# Patient Record
Sex: FEMALE | Race: WHITE | NOT HISPANIC OR LATINO | URBAN - METROPOLITAN AREA
[De-identification: names, ages, dates, MRNs, and addresses within clinical notes are randomized per-mention and may not be internally consistent; named-entity substitution may affect disease eponyms.]

---

## 2017-01-17 ENCOUNTER — APPOINTMENT (RX ONLY)
Dept: URBAN - METROPOLITAN AREA SURGERY 2 | Facility: SURGERY | Age: 42
Setting detail: DERMATOLOGY
End: 2017-01-17

## 2017-01-17 DIAGNOSIS — Z41.9 ENCOUNTER FOR PROCEDURE FOR PURPOSES OTHER THAN REMEDYING HEALTH STATE, UNSPECIFIED: ICD-10-CM

## 2017-01-17 PROCEDURE — ? BOTOX

## 2017-01-17 PROCEDURE — ? CONSULTATION - BOTULINUM TOXIN

## 2017-01-17 PROCEDURE — ? CONSULTATION - FILLERS

## 2017-01-17 PROCEDURE — ? FILLERS

## 2017-01-17 PROCEDURE — ? PRESCRIPTION

## 2017-01-17 RX ORDER — VALACYCLOVIR HYDROCHLORIDE 500 MG/1
TABLET, FILM COATED ORAL
Qty: 28 | Refills: 2 | Status: ERX | COMMUNITY
Start: 2017-01-17

## 2017-01-17 RX ADMIN — VALACYCLOVIR HYDROCHLORIDE: 500 TABLET, FILM COATED ORAL at 14:01

## 2017-01-17 NOTE — PROCEDURE: CONSULTATION - FILLERS
Labiomental Sulcus Secondary Filler Volume In Cc (Estimated): 0
Detail Level: Zone
Send Procedure Quote As Charge: No

## 2017-01-17 NOTE — PROCEDURE: BOTOX
Additional Area 2 Location: Chin
Additional Area 2 Units: 0
Bill Summary Price Listed Below, Or Bill Total Of Units X Price Per Unit?: Bill #Units x Price Per Unit
Additional Area 1 Location: Eyebrows rhytids
Glabellar Complex Units: 10
Administered By (Optional): Georgia Boucher RN
Expiration Date (Month Year): 08/19
Postcare Instructions: Patient instructed to not lie down for 4 hours and limit physical activity for 24 hours. Patient instructed not to travel by airplane for 48 hours.\\n\\n*****
Additional Area 3 Location: Eyebrow
Forehead Units: 5
Consent: Written consent was obtained prior to the procedure. Risks, benefits, expectations and alternatives were discussed including, but not limited to, infection, bleeding, lid/brow ptosis, bruising, swelling, diplopia, temporary effects, incomplete chemical denervation and dissatisfaction with the cosmetic outcome. No guarantee or warranty was given or implied regarding longevity of results.
Use Map Statement For Sites (Optional): No
Detail Level: Simple
Show Price In Note?: yes
Lot #: [id number]
Price Per Unit In $ (Use Numbers Only, No Text Please.): 17
Map Statment: See attached map for complete details

## 2017-01-17 NOTE — PROCEDURE: FILLERS
Vermillion Lips Filler Volume In Cc: 0
Expiration Date (Month Year): 07/17
Price $ (Numeric Only, No Text Please.): 051
Price $ (Numeric Only, No Text Please.): 244
Use Map Statement For Sites (Optional): No
Additional Area 2 Location: Hindu divot
Expiration Date (Month Year): 03/18
Detail Level: Simple
Filler: Belotero
Filler: Juvederm Ultra XC
Lot #: [id number]
Administered By (Optional): Georgia Boucher
Additional Area 1 Location: Lips
Consent: Written consent obtained. Risks include but not limited to bruising, beading, irregular texture, ulceration, infection, allergic reaction, scar formation, incomplete augmentation, temporary nature, procedural pain.
Tear Troughs Filler Volume In Cc: 0.4
Lot #: [id number]
Map Statment: See attached map for complete details
Additional Area 1 Volume In Cc: 0.2
Topical Anesthesia?: yes
Additional Area [address] Location: [facility]

## 2017-01-30 ENCOUNTER — APPOINTMENT (RX ONLY)
Dept: URBAN - METROPOLITAN AREA SURGERY 2 | Facility: SURGERY | Age: 42
Setting detail: DERMATOLOGY
End: 2017-01-30

## 2017-01-30 DIAGNOSIS — Z42.8 ENCOUNTER FOR OTHER PLASTIC AND RECONSTRUCTIVE SURGERY FOLLOWING MEDICAL PROCEDURE OR HEALED INJURY: ICD-10-CM

## 2017-01-30 DIAGNOSIS — Z428 OTHER AFTERCARE INVOLVING THE USE OF PLASTIC SURGERY: ICD-10-CM

## 2017-01-30 PROCEDURE — ? FILLERS

## 2017-01-30 NOTE — PROCEDURE: FILLERS
Lateral Canthal Filler Volume In Cc: 0
Detail Level: Simple
Additional Area 2 Location: Yazdanism divot
Filler: Belotero
Lot #: Residual
Filler: Juvederm Ultra XC
Use Map Statement For Sites (Optional): No
Map Statment: See attached map for complete details
Topical Anesthesia?: yes
Additional Area 1 Volume In Cc: 0.2
Additional Area 1 Location: Lips
Consent: Written consent obtained. Risks include but not limited to bruising, beading, irregular texture, ulceration, infection, allergic reaction, scar formation, incomplete augmentation, temporary nature, procedural pain.
Additional Area 4 Location: [facility]
Administered By (Optional): Georgia Boucher

## 2017-03-01 ENCOUNTER — APPOINTMENT (RX ONLY)
Dept: URBAN - METROPOLITAN AREA SURGERY 2 | Facility: SURGERY | Age: 42
Setting detail: DERMATOLOGY
End: 2017-03-01

## 2017-03-01 DIAGNOSIS — Z41.9 ENCOUNTER FOR PROCEDURE FOR PURPOSES OTHER THAN REMEDYING HEALTH STATE, UNSPECIFIED: ICD-10-CM

## 2017-03-01 PROCEDURE — ? FILLERS

## 2017-03-01 NOTE — PROCEDURE: FILLERS
Temple Hollows Filler Volume In Cc: 0
Map Statment: See attached map for complete details
Additional Area 1 Volume In Cc: 0.1
Administered By (Optional): Georgia Boucher
Additional Area 4 Location: [facility]
Filler: Belotero
Additional Area 1 Location: Lips
Consent: Written consent obtained. Risks include but not limited to bruising, beading, irregular texture, ulceration, infection, allergic reaction, scar formation, incomplete augmentation, temporary nature, procedural pain.
Topical Anesthesia?: yes
Detail Level: Simple
Lot #: Residual
Additional Area 1 Location: Glabella
Filler: Juvederm Ultra XC
Additional Area 2 Location: Christian divot
Use Map Statement For Sites (Optional): No

## 2017-03-10 ENCOUNTER — APPOINTMENT (RX ONLY)
Dept: URBAN - METROPOLITAN AREA SURGERY 2 | Facility: SURGERY | Age: 42
Setting detail: DERMATOLOGY
End: 2017-03-10

## 2017-03-10 DIAGNOSIS — Z41.9 ENCOUNTER FOR PROCEDURE FOR PURPOSES OTHER THAN REMEDYING HEALTH STATE, UNSPECIFIED: ICD-10-CM

## 2017-03-10 PROCEDURE — ? FILLERS

## 2017-03-10 NOTE — PROCEDURE: FILLERS
Vermillion Lips Filler Volume In Cc: 0
Additional Area 4 Location: [facility]
Filler: Belotero
Additional Area 1 Volume In Cc: 0.1
Filler: Restylane Refyne
Detail Level: Simple
Additional Area 1 Location: Temples
Additional Area 2 Location: Episcopal divot
Topical Anesthesia?: yes
Filler: Juvederm Ultra XC
Consent: Written consent obtained. Risks include but not limited to bruising, beading, irregular texture, ulceration, infection, allergic reaction, scar formation, incomplete augmentation, temporary nature, procedural pain.
Map Statment: See attached map for complete details
Lot #: [id number]
Use Map Statement For Sites (Optional): No
Lot #: Residual
Administered By (Optional): Georgia Boucher
Tear Troughs Filler Volume In Cc: 0.2
Expiration Date (Month Year): 05/18
Additional Area 1 Location: Lips

## 2017-04-04 ENCOUNTER — APPOINTMENT (RX ONLY)
Dept: URBAN - METROPOLITAN AREA SURGERY 2 | Facility: SURGERY | Age: 42
Setting detail: DERMATOLOGY
End: 2017-04-04

## 2017-04-04 DIAGNOSIS — Z41.9 ENCOUNTER FOR PROCEDURE FOR PURPOSES OTHER THAN REMEDYING HEALTH STATE, UNSPECIFIED: ICD-10-CM

## 2017-04-04 DIAGNOSIS — Z428 OTHER AFTERCARE INVOLVING THE USE OF PLASTIC SURGERY: ICD-10-CM

## 2017-04-04 PROCEDURE — ? FILLERS

## 2017-04-04 PROCEDURE — ? BOTOX

## 2017-04-04 NOTE — PROCEDURE: FILLERS
Cheeks Filler Volume In Cc: 0
Topical Anesthesia?: yes
Additional Area 2 Location: Forehead depression
Use Map Statement For Sites (Optional): No
Lower Lips Filler Volume In Cc: 0.2
Additional Area 1 Location: Glabellar lines
Additional Area 4 Location: [facility]
Administered By (Optional): Georgia Boucher
Filler: Juvederm Ultra XC
Lot #: Residual
Additional Area 1 Location: Nasal dorsum
Detail Level: Simple
Consent: Written consent obtained. Risks include but not limited to bruising, beading, irregular texture, ulceration, infection, allergic reaction, scar formation, incomplete augmentation, temporary nature, procedural pain.
Filler: Restylane Refyne
Additional Area 1 Location: Oral commissures
Map Statment: See attached map for complete details
Filler: Belotero
Tear Troughs Filler Volume In Cc: 0.1

## 2017-04-04 NOTE — PROCEDURE: BOTOX
Price Per Unit In $ (Use Numbers Only, No Text Please.): 17
Consent: Written consent was obtained prior to the procedure. Risks, benefits, expectations and alternatives were discussed including, but not limited to, infection, bleeding, lid/brow ptosis, bruising, swelling, diplopia, temporary effects, incomplete chemical denervation and dissatisfaction with the cosmetic outcome. No guarantee or warranty was given or implied regarding longevity of results.
Lot #: [id number]
Nasal Root Units: 0
Map Statment: See attached map for complete details
Bill Summary Price Listed Below, Or Bill Total Of Units X Price Per Unit?: Bill #Units x Price Per Unit
Expiration Date (Month Year): 10/2019
Use Map Statement For Sites (Optional): No
Additional Area 2 Location: Chin
Detail Level: Simple
Additional Area 1 Location: Necklace lines
Glabellar Complex Units: 12.5
Dilution (U/0.1 Cc): 5
Administered By (Optional): Georgia Boucher RN
Additional Area 3 Location: Left Brow
Postcare Instructions: Patient instructed to not lie down for 4 hours and limit physical activity for 24 hours. Patient instructed not to travel by airplane for 48 hours.\\n\\n*****
Show Price In Note?: yes

## 2017-05-05 ENCOUNTER — APPOINTMENT (RX ONLY)
Dept: URBAN - METROPOLITAN AREA SURGERY 2 | Facility: SURGERY | Age: 42
Setting detail: DERMATOLOGY
End: 2017-05-05

## 2017-05-05 DIAGNOSIS — Z41.9 ENCOUNTER FOR PROCEDURE FOR PURPOSES OTHER THAN REMEDYING HEALTH STATE, UNSPECIFIED: ICD-10-CM

## 2017-05-05 PROCEDURE — ? FILLERS

## 2017-05-05 NOTE — PROCEDURE: FILLERS
Cheeks Filler Volume In Cc: 0
Expiration Date (Month Year): 05/18
Detail Level: Simple
Additional Area 1 Location: Perioral
Map Statment: See attached map for complete details
Additional Area 4 Location: [facility]
Filler: Restylane Refyne
Additional Area 2 Location: Nasal root
Additional Area 1 Location: Oral commissures
Tear Troughs Filler Volume In Cc: 0.1
Additional Area 3 Location: Ear lobe
Upper Lip Filler Volume In Cc: 0.2
Topical Anesthesia?: yes
Filler: Juvederm Ultra XC
Lot #: [id number]
Additional Area 3 Location: Glabellar
Consent: Written consent obtained. Risks include but not limited to bruising, beading, irregular texture, ulceration, infection, allergic reaction, scar formation, incomplete augmentation, temporary nature, procedural pain.
Filler: Belotero
Additional Area 5 Location: Lips
Lot #: Residual
Administered By (Optional): Georgia Boucher
Use Map Statement For Sites (Optional): No
Price $ (Numeric Only, No Text Please.): 375

## 2017-07-03 ENCOUNTER — APPOINTMENT (RX ONLY)
Dept: URBAN - METROPOLITAN AREA SURGERY 2 | Facility: SURGERY | Age: 42
Setting detail: DERMATOLOGY
End: 2017-07-03

## 2017-07-03 DIAGNOSIS — Z428 OTHER AFTERCARE INVOLVING THE USE OF PLASTIC SURGERY: ICD-10-CM

## 2017-07-03 DIAGNOSIS — Z41.9 ENCOUNTER FOR PROCEDURE FOR PURPOSES OTHER THAN REMEDYING HEALTH STATE, UNSPECIFIED: ICD-10-CM

## 2017-07-03 PROCEDURE — ? BOTOX

## 2017-07-03 PROCEDURE — ? FILLERS

## 2017-07-03 NOTE — PROCEDURE: FILLERS
Show Price In Note?: yes
Temple Hollows Filler Volume In Cc: 0
Topical Anesthetic Base: ointment
Additional Area 5 Location: Lips
Additional Area 3 Location: Oral commissures
Filler: Belotero
Additional Area 2 Location: Glabellar
Additional Area 3 Location: Ear lobe
Filler: Juvederm Ultra XC
Consent: Written consent obtained. Risks include but not limited to bruising, beading, irregular texture, ulceration, infection, allergic reaction, scar formation, incomplete augmentation, temporary nature, procedural pain.
Expiration Date (Month Year): 09/2017
Detail Level: Simple
Additional Area 1 Location: Perioral
Additional Area 2 Location: ear lobes
Lot #: [id number]
Use Map Statement For Sites (Optional): No
Additional Area 4 Location: scar on nose
Administered By (Optional): Georgia Boucher
Map Statment: See attached map for complete details

## 2017-07-03 NOTE — PROCEDURE: BOTOX
Additional Area 3 Location: DTI
Lot #: [id number]
Forehead Units: 0
Dilution (U/0.1 Cc): 5
Show Price In Note?: yes
Periorbital Skin Units: 15
Bill Summary Price Listed Below, Or Bill Total Of Units X Price Per Unit?: Bill #Units x Price Per Unit
Additional Area 2 Location: Chin
Postcare Instructions: Patient instructed to not lie down for 4 hours and limit physical activity for 24 hours. Patient instructed not to travel by airplane for 48 hours.\\n\\n*****
Expiration Date (Month Year): 01/20
Additional Area 1 Location: perioral
Consent: Written consent was obtained prior to the procedure. Risks, benefits, expectations and alternatives were discussed including, but not limited to, infection, bleeding, lid/brow ptosis, bruising, swelling, diplopia, temporary effects, incomplete chemical denervation and dissatisfaction with the cosmetic outcome. No guarantee or warranty was given or implied regarding longevity of results.
Detail Level: Simple
Price Per Unit In $ (Use Numbers Only, No Text Please.): 12
Administered By (Optional): Georgia Boucher RN
Use Map Statement For Sites (Optional): No
Map Statment: See attached map for complete details

## 2017-09-20 ENCOUNTER — APPOINTMENT (RX ONLY)
Dept: URBAN - METROPOLITAN AREA SURGERY 2 | Facility: SURGERY | Age: 42
Setting detail: DERMATOLOGY
End: 2017-09-20

## 2017-09-20 DIAGNOSIS — Z41.9 ENCOUNTER FOR PROCEDURE FOR PURPOSES OTHER THAN REMEDYING HEALTH STATE, UNSPECIFIED: ICD-10-CM

## 2017-09-20 DIAGNOSIS — Z428 OTHER AFTERCARE INVOLVING THE USE OF PLASTIC SURGERY: ICD-10-CM

## 2017-09-20 PROCEDURE — ? FILLERS

## 2017-09-20 PROCEDURE — ? BOTOX

## 2017-09-20 PROCEDURE — ? PRESCRIPTION

## 2017-09-20 RX ORDER — VALACYCLOVIR HYDROCHLORIDE 500 MG/1
TABLET, FILM COATED ORAL
Qty: 30 | Refills: 2 | Status: ERX

## 2017-09-20 NOTE — PROCEDURE: BOTOX
Show Price In Note?: yes
Additional Area 1 Units: 0
Additional Area 3 Location: DTI
Price Per Unit In $ (Use Numbers Only, No Text Please.): 13
Additional Area 2 Location: Nasolabial fold
Detail Level: Simple
Forehead Units: 5
Additional Area 1 Location: left eyebrow
Consent: Written consent was obtained prior to the procedure. Risks, benefits, expectations and alternatives were discussed including, but not limited to, infection, bleeding, lid/brow ptosis, bruising, swelling, diplopia, temporary effects, incomplete chemical denervation and dissatisfaction with the cosmetic outcome. No guarantee or warranty was given or implied regarding longevity of results.
Map Statment: See attached map for complete details
Use Map Statement For Sites (Optional): No
Postcare Instructions: Patient instructed to not lie down for 4 hours and limit physical activity for 24 hours. Patient instructed not to travel by airplane for 48 hours.\\n\\n*****
Bill Summary Price Listed Below, Or Bill Total Of Units X Price Per Unit?: Bill #Units x Price Per Unit
Glabellar Complex Units: 10
Administered By (Optional): Georgia Boucher RN
Expiration Date (Month Year): 04/20
Lot #: [id number]

## 2017-12-29 ENCOUNTER — APPOINTMENT (RX ONLY)
Dept: URBAN - METROPOLITAN AREA CLINIC 12 | Facility: CLINIC | Age: 42
Setting detail: DERMATOLOGY
End: 2017-12-29

## 2017-12-29 DIAGNOSIS — Z41.9 ENCOUNTER FOR PROCEDURE FOR PURPOSES OTHER THAN REMEDYING HEALTH STATE, UNSPECIFIED: ICD-10-CM

## 2017-12-29 DIAGNOSIS — Z428 OTHER AFTERCARE INVOLVING THE USE OF PLASTIC SURGERY: ICD-10-CM

## 2017-12-29 PROCEDURE — ? BOTOX

## 2017-12-29 PROCEDURE — ? FILLERS

## 2017-12-29 NOTE — PROCEDURE: FILLERS
Show Price In Note?: yes
Vermillion Lips Filler Volume In Cc: 0
Additional Area 3 Location: perioral lines
Expiration Date (Month Year): 06/18
Consent: Written consent obtained. Risks include but not limited to bruising, beading, irregular texture, ulceration, infection, allergic reaction, scar formation, incomplete augmentation, temporary nature, procedural pain.
Topical % #1: 23
Detail Level: Simple
Lot #: [id number]
Additional Area 1 Location: glabellar/nasal root lines
Additional Area 1 Volume In Cc: 0.1
Topical Anesthetic Base: ointment
Map Statment: See attached map for complete details
Filler: Juvederm Ultra Plus XC
Additional Area 4 Location: periorbital
Topical Anesthetic #2: tetracaine
Additional Area 2 Location: forehead depressions
Lot #: [id number]
Expiration Date (Month Year): 09/18
Additional Area 5 Location: oral commissures
Filler: Belotero
Topical Anesthetic #1: lidocaine
Topical % #2: 7
Administered By (Optional): Georgia Boucher

## 2017-12-29 NOTE — PROCEDURE: BOTOX
Map Statment: See attached map for complete details
Detail Level: Simple
Additional Area 1 Units: 0
Additional Area 3 Location: necklace lines
Additional Area 1 Location: chin
Consent: Written consent was obtained prior to the procedure. Risks, benefits, expectations and alternatives were discussed including, but not limited to, infection, bleeding, lid/brow ptosis, bruising, swelling, diplopia, temporary effects, incomplete chemical denervation and dissatisfaction with the cosmetic outcome. No guarantee or warranty was given or implied regarding longevity of results.
Dilution (U/0.1 Cc): 5
Price Per Unit In $ (Use Numbers Only, No Text Please.): 15
Glabellar Complex Units: 10
Expiration Date (Month Year): 08/20
Show Price In Note?: yes
Bill Summary Price Listed Below, Or Bill Total Of Units X Price Per Unit?: Bill #Units x Price Per Unit
Additional Area 2 Location: muscle spasm
Lot #: [id number]
Topical Anesthesia?: 23% lidocaine, 7% tetracaine
Administered By (Optional): Georgia Boucher
Postcare Instructions: Patient instructed to not lie down for 4 hours and limit physical activity for 24 hours. Patient instructed not to travel by airplane for 48 hours.

## 2018-04-06 ENCOUNTER — APPOINTMENT (RX ONLY)
Dept: URBAN - METROPOLITAN AREA CLINIC 12 | Facility: CLINIC | Age: 43
Setting detail: DERMATOLOGY
End: 2018-04-06

## 2018-04-06 DIAGNOSIS — Z41.9 ENCOUNTER FOR PROCEDURE FOR PURPOSES OTHER THAN REMEDYING HEALTH STATE, UNSPECIFIED: ICD-10-CM

## 2018-04-06 PROCEDURE — ? FILLERS

## 2018-04-06 PROCEDURE — ? BOTOX

## 2018-04-06 NOTE — PROCEDURE: BOTOX
Postcare Instructions: Patient instructed to not lie down for 4 hours and limit physical activity for 24 hours. Patient instructed not to travel by airplane for 48 hours.
Additional Area 1 Units: 0
Glabellar Complex Units: 10
Forehead Units: 5
Additional Area 2 Location: nasalis depressor
Show Price In Note?: yes
Expiration Date (Month Year): 10/2020
Additional Area 1 Location: chin
Map Statment: See attached map for complete details
Price Per Unit In $ (Use Numbers Only, No Text Please.): 15
Lot #: [id number]
Consent: Written consent was obtained prior to the procedure. Risks, benefits, expectations and alternatives were discussed including, but not limited to, infection, bleeding, lid/brow ptosis, bruising, swelling, diplopia, temporary effects, incomplete chemical denervation and dissatisfaction with the cosmetic outcome. No guarantee or warranty was given or implied regarding longevity of results.
Detail Level: Simple
Additional Area 3 Location: tail end of brows
Bill Summary Price Listed Below, Or Bill Total Of Units X Price Per Unit?: Bill #Units x Price Per Unit
Administered By (Optional): Georgia Boucher

## 2018-04-06 NOTE — PROCEDURE: FILLERS
Jawline Filler Volume In Cc: 0
Use Map Statement For Sites (Optional): Yes
Administered By (Optional): Georgia Boucher
Lower Lips Filler Volume In Cc: 0.1
Detail Level: Simple
Additional Area 1 Location: shadow upper lip
Additional Area 5 Location: oral commissures
Additional Area 4 Location: chin
Consent: Written consent obtained. Risks include but not limited to bruising, beading, irregular texture, ulceration, infection, allergic reaction, scar formation, incomplete augmentation, temporary nature, procedural pain.
Additional Area 2 Location: forehead depressions
Lot #: residual
Topical Anesthetic #1: lidocaine
Filler: Juvederm Ultra Plus XC
Additional Area 1 Location: glabellar/nasal root lines
Topical Anesthetic #2: tetracaine
Map Statment: See attached map for complete details
Topical Anesthetic Base: ointment
Additional Area 3 Location: perioral lines
Topical % #1: 23
Topical % #2: 7

## 2018-05-18 ENCOUNTER — APPOINTMENT (RX ONLY)
Dept: URBAN - METROPOLITAN AREA CLINIC 12 | Facility: CLINIC | Age: 43
Setting detail: DERMATOLOGY
End: 2018-05-18

## 2018-05-18 DIAGNOSIS — Z428 OTHER AFTERCARE INVOLVING THE USE OF PLASTIC SURGERY: ICD-10-CM

## 2018-05-18 DIAGNOSIS — Z42.8 ENCOUNTER FOR OTHER PLASTIC AND RECONSTRUCTIVE SURGERY FOLLOWING MEDICAL PROCEDURE OR HEALED INJURY: ICD-10-CM

## 2018-05-18 PROCEDURE — ? FILLERS

## 2018-05-18 PROCEDURE — ? BOTOX

## 2018-05-18 NOTE — PROCEDURE: BOTOX
Lot #: [id number]
Expiration Date (Month Year): 12/2020
Perioral Units: 0
Consent: Written consent was obtained prior to the procedure. Risks, benefits, expectations and alternatives were discussed including, but not limited to, infection, bleeding, lid/brow ptosis, bruising, swelling, diplopia, temporary effects, incomplete chemical denervation and dissatisfaction with the cosmetic outcome. No guarantee or warranty was given or implied regarding longevity of results.
Additional Area 2 Location: nasalis depressor
Use Map Statement For Sites (Optional): Yes
Forehead Units: 5
Detail Level: Simple
Map Statment: See attached map for complete details
Additional Area 1 Location: chin
Additional Area 3 Location: tail end of brows
Price Per Unit In $ (Use Numbers Only, No Text Please.): 12
Administered By (Optional): Georgia Boucher
Postcare Instructions: Patient instructed to not lie down for 4 hours and limit physical activity for 24 hours. Patient instructed not to travel by airplane for 48 hours.
Bill Summary Price Listed Below, Or Bill Total Of Units X Price Per Unit?: Bill #Units x Price Per Unit

## 2018-05-18 NOTE — PROCEDURE: FILLERS
Lateral Face Filler Volume In Cc: 0
Detail Level: Simple
Additional Area 3 Location: glabellar scar and perioral line
Lot #: residual
Additional Area 2 Location: glabellar line
Topical Anesthetic #1: lidocaine
Additional Area 5 Location: oral commissures
Additional Area 4 Location: cheek depressions
Topical % #2: 7
Consent: Written consent obtained. Risks include but not limited to bruising, beading, irregular texture, ulceration, infection, allergic reaction, scar formation, incomplete augmentation, temporary nature, procedural pain.
Additional Area 4 Location: chin
Topical % #1: 23
Topical Anesthesia?: yes
Additional Area 3 Location: perioral lines
Additional Area 2 Location: forehead depressions
Additional Area 1 Location: shadow upper lip
Topical Anesthetic Base: ointment
Map Statment: See attached map for complete details
Topical Anesthetic #2: tetracaine
Lower Lips Filler Volume In Cc: 0.1
Filler: Juvederm Ultra XC
Administered By (Optional): Georgia Boucher RN
Additional Area 1 Location: glabellar/nasal root lines

## 2018-08-10 ENCOUNTER — APPOINTMENT (RX ONLY)
Dept: URBAN - METROPOLITAN AREA CLINIC 12 | Facility: CLINIC | Age: 43
Setting detail: DERMATOLOGY
End: 2018-08-10

## 2018-08-10 DIAGNOSIS — Z41.9 ENCOUNTER FOR PROCEDURE FOR PURPOSES OTHER THAN REMEDYING HEALTH STATE, UNSPECIFIED: ICD-10-CM

## 2018-08-10 PROCEDURE — ? FILLERS

## 2018-08-10 PROCEDURE — ? BOTOX

## 2018-08-10 PROCEDURE — ? PRESCRIPTION

## 2018-08-10 RX ORDER — VALACYCLOVIR HYDROCHLORIDE 500 MG/1
TABLET, FILM COATED ORAL
Qty: 30 | Refills: 2 | Status: ERX

## 2018-08-10 NOTE — PROCEDURE: FILLERS
Lateral Face Filler Volume In Cc: 0
Lot #: residual
Topical Anesthesia?: yes
Detail Level: Simple
Topical % #1: 23
Additional Area 5 Location: oral commissures
Topical % #2: 7
Map Statment: See attached map for complete details
Consent: Written consent obtained. Risks include but not limited to bruising, beading, irregular texture, ulceration, infection, allergic reaction, scar formation, incomplete augmentation, temporary nature, procedural pain.
Administered By (Optional): Georgia Boucher
Additional Area 1 Location: glabellar/nasal root lines
Topical Anesthetic #2: tetracaine
Upper Lip Filler Volume In Cc: 0.1
Additional Area 2 Location: forehead depressions
Topical Anesthetic Base: ointment
Additional Area 4 Location: chin
Filler: Juvederm Ultra Plus XC
Additional Area 3 Location: perioral lines
Topical Anesthetic #1: lidocaine
Additional Area 1 Location: shadow upper lip

## 2018-08-10 NOTE — PROCEDURE: BOTOX
Platsyma Units: 0
Additional Area 3 Location: tail end of brows
Use Map Statement For Sites (Optional): Yes
Administered By (Optional): Georgia Boucher
Forehead Units: 5
Additional Area 2 Location: nasalis depressor
Expiration Date (Month Year): 03/2021
Consent: Written consent was obtained prior to the procedure. Risks, benefits, expectations and alternatives were discussed including, but not limited to, infection, bleeding, lid/brow ptosis, bruising, swelling, diplopia, temporary effects, incomplete chemical denervation and dissatisfaction with the cosmetic outcome. No guarantee or warranty was given or implied regarding longevity of results.
Bill Summary Price Listed Below, Or Bill Total Of Units X Price Per Unit?: Bill #Units x Price Per Unit
Glabellar Complex Units: 10
Lot #: [id number]
Detail Level: Simple
Additional Area 1 Location: chin
Postcare Instructions: Patient instructed to not lie down for 4 hours and limit physical activity for 24 hours. Patient instructed not to travel by airplane for 48 hours.
Price Per Unit In $ (Use Numbers Only, No Text Please.): 15
Map Statment: See attached map for complete details

## 2018-11-30 ENCOUNTER — APPOINTMENT (RX ONLY)
Dept: URBAN - METROPOLITAN AREA CLINIC 12 | Facility: CLINIC | Age: 43
Setting detail: DERMATOLOGY
End: 2018-11-30

## 2018-11-30 DIAGNOSIS — Z41.9 ENCOUNTER FOR PROCEDURE FOR PURPOSES OTHER THAN REMEDYING HEALTH STATE, UNSPECIFIED: ICD-10-CM

## 2018-11-30 PROCEDURE — ? BOTOX

## 2018-11-30 ASSESSMENT — LOCATION ZONE DERM: LOCATION ZONE: FACE

## 2018-11-30 ASSESSMENT — LOCATION SIMPLE DESCRIPTION DERM: LOCATION SIMPLE: SUPERIOR FOREHEAD

## 2018-11-30 ASSESSMENT — LOCATION DETAILED DESCRIPTION DERM: LOCATION DETAILED: SUPERIOR MID FOREHEAD

## 2018-11-30 NOTE — PROCEDURE: BOTOX
Detail Level: Simple
Additional Area 3 Location: nasalis depressor
Additional Area 1 Location: [facility]
Price Per Unit In $ (Use Numbers Only, No Text Please.): 17.00
Consent: Written consent was obtained prior to the procedure. Risks, benefits, expectations and alternatives were discussed including, but not limited to, infection, bleeding, lid/brow ptosis, bruising, swelling, diplopia, temporary effects, incomplete chemical denervation and dissatisfaction with the cosmetic outcome. No guarantee or warranty was given or implied regarding longevity of results.
Map Statment: See attached map for complete details
Periorbital Skin Units: 0
Reconstitution Date: 11/30/2018
Postcare Instructions: Patient instructed to not lie down for 4 hours and limit physical activity for 24 hours. Patient instructed not to travel by airplane for 48 hours.
Dilution (U/0.1 Cc): 5
Lot #: [id number]
Summary Price $ (Use Numbers Only, No Text Please.): 297.31
Glabellar Complex Units: 10
Additional Area 2 Location: tail end of brow
Use Map Statement For Sites (Optional): Yes
Administered By (Optional): Georgia Boucher
Bill Summary Price Listed Below, Or Bill Total Of Units X Price Per Unit?: Bill Summary Price Below
Expiration Date (Month Year): 05/2021
Forehead Units: 7.5

## 2019-03-22 ENCOUNTER — APPOINTMENT (RX ONLY)
Dept: URBAN - METROPOLITAN AREA CLINIC 12 | Facility: CLINIC | Age: 44
Setting detail: DERMATOLOGY
End: 2019-03-22

## 2019-03-22 DIAGNOSIS — Z41.9 ENCOUNTER FOR PROCEDURE FOR PURPOSES OTHER THAN REMEDYING HEALTH STATE, UNSPECIFIED: ICD-10-CM

## 2019-03-22 PROCEDURE — ? BOTOX

## 2019-03-22 PROCEDURE — ? FILLERS

## 2019-03-22 PROCEDURE — ? PRESCRIPTION

## 2019-03-22 RX ORDER — VALACYCLOVIR HYDROCHLORIDE 500 MG/1
TABLET, FILM COATED ORAL
Qty: 30 | Refills: 4 | Status: ERX

## 2019-03-22 NOTE — PROCEDURE: BOTOX
Map Statment: See attached map for complete details
Periorbital Skin Units: 5
Additional Area 3 Location: nasalis depressor
Show Price In Note?: yes
Detail Level: Simple
Lot #: [id number]
Consent: Written consent was obtained prior to the procedure. Risks, benefits, expectations and alternatives were discussed including, but not limited to, infection, bleeding, lid/brow ptosis, bruising, swelling, diplopia, temporary effects, incomplete chemical denervation and dissatisfaction with the cosmetic outcome. No guarantee or warranty was given or implied regarding longevity of results.
Nasal Root Units: 0
Price Per Unit In $ (Use Numbers Only, No Text Please.): 15
Expiration Date (Month Year): 08/2021
Additional Area 2 Location: Chin
Additional Area 1 Location: tail of brow
Administered By (Optional): Georgia Boucher
Postcare Instructions: Patient instructed to not lie down for 4 hours and limit physical activity for 24 hours. Patient instructed not to travel by airplane for 48 hours.
Bill Summary Price Listed Below, Or Bill Total Of Units X Price Per Unit?: Bill #Units x Price Per Unit
Glabellar Complex Units: 10

## 2019-03-22 NOTE — PROCEDURE: FILLERS
Upper Lip Filler Volume In Cc: 0.1
Lateral Face Filler Volume In Cc: 0
Additional Area 5 Location: frontalis rhytids
Additional Area 4 Location: necklace lines
Detail Level: Simple
Filler: Juvederm Ultra XC
Use Map Statement For Sites (Optional): Yes
Additional Area 5 Location: mentalis and jawline
Additional Area 1 Location: horizontal perioral line
Topical % #1: 23
Consent: Written consent obtained. Risks include but not limited to bruising, beading, irregular texture, ulceration, infection, allergic reaction, scar formation, incomplete augmentation, temporary nature, procedural pain.
Additional Area 1 Location: glabellar/nasal root lines
Lot #: residual
Topical Anesthetic #1: lidocaine
Additional Area 2 Location: glabellar line
Additional Area 2 Location: forehead depressions
Map Statment: See attached map for complete details
Administered By (Optional): Georgia Boucher RN
Additional Area 3 Location: perioral lines and chin
Additional Area 3 Location: perioral lines
Topical Anesthetic Base: ointment
Topical % #2: 7
Topical Anesthetic #2: tetracaine
Additional Area 4 Location: cheek depressions

## 2019-08-05 ENCOUNTER — APPOINTMENT (RX ONLY)
Dept: URBAN - METROPOLITAN AREA CLINIC 12 | Facility: CLINIC | Age: 44
Setting detail: DERMATOLOGY
End: 2019-08-05

## 2019-08-05 DIAGNOSIS — Z41.9 ENCOUNTER FOR PROCEDURE FOR PURPOSES OTHER THAN REMEDYING HEALTH STATE, UNSPECIFIED: ICD-10-CM

## 2019-08-05 PROCEDURE — ? BOTOX

## 2019-08-05 PROCEDURE — ? FILLERS

## 2019-08-05 NOTE — PROCEDURE: BOTOX
Postcare Instructions: Patient instructed to not lie down for 4 hours and limit physical activity for 24 hours. Patient instructed not to travel by airplane for 48 hours.
Additional Area 1 Units: 0
Additional Area 1 Location: in the brow
Price Per Unit In $ (Use Numbers Only, No Text Please.): 17
Glabellar Complex Units: 10
Forehead Units: 5
Use Map Statement For Sites (Optional): Yes
Expiration Date (Month Year): 01/2022
Consent: Written consent was obtained prior to the procedure. Risks, benefits, expectations and alternatives were discussed including, but not limited to, infection, bleeding, lid/brow ptosis, bruising, swelling, diplopia, temporary effects, incomplete chemical denervation and dissatisfaction with the cosmetic outcome. No guarantee or warranty was given or implied regarding longevity of results.
Additional Area 2 Location: Chin
Map Statment: See attached map for complete details
Additional Area 3 Location: nasalis depressor
Detail Level: Simple
Topical Anesthesia?: 23% lidocaine, 7% tetracaine
Administered By (Optional): Georgia Boucher
Lot #: [id number]
Bill Summary Price Listed Below, Or Bill Total Of Units X Price Per Unit?: Bill #Units x Price Per Unit

## 2019-08-05 NOTE — PROCEDURE: FILLERS
Additional Area 1 Location: perioral lines
Marionette Lines Filler Volume In Cc: 0
Additional Area 5 Location: oral commissures
Additional Area 1 Location: glabellar/nasal root lines
Show Price In Note?: yes
Consent: Written consent obtained. Risks include but not limited to bruising, beading, irregular texture, ulceration, infection, allergic reaction, scar formation, incomplete augmentation, temporary nature, procedural pain.
Lot #: [id number]
Additional Area 2 Location: glabellar line
Additional Area 2 Location: smile lines
Additional Area 3 Location: smile line
Topical % #1: 23
Detail Level: Simple
Additional Area 4 Location: cheek depressions
Map Statment: See attached map for complete details
Topical % #2: 7
Additional Area 4 Location: forehead depressions
Topical Anesthetic Base: ointment
Upper Lip Filler Volume In Cc: 0.2
Topical Anesthetic #1: lidocaine
Additional Area 5 Location: frontalis rhytids
Administered By (Optional): Georgia Boucher RN
Filler: Juvederm Ultra XC
Topical Anesthetic #2: tetracaine

## 2020-02-24 ENCOUNTER — APPOINTMENT (RX ONLY)
Dept: URBAN - METROPOLITAN AREA CLINIC 12 | Facility: CLINIC | Age: 45
Setting detail: DERMATOLOGY
End: 2020-02-24

## 2020-02-24 DIAGNOSIS — Z41.9 ENCOUNTER FOR PROCEDURE FOR PURPOSES OTHER THAN REMEDYING HEALTH STATE, UNSPECIFIED: ICD-10-CM

## 2020-02-24 PROCEDURE — ? BOTOX

## 2020-02-24 NOTE — PROCEDURE: BOTOX
Kirk Units: 0
Price Per Unit In $ (Use Numbers Only, No Text Please.): 15
Lot #: [id number]
Additional Area 2 Location: Chin
Administered By (Optional): Georgia Boucher
Postcare Instructions: Patient instructed to not lie down for 4 hours and limit physical activity for 24 hours. Patient instructed not to travel by airplane for 48 hours.
Forehead Units: 5
Expiration Date (Month Year): 08/2022
Additional Area 1 Location: sneer muscles
Use Map Statement For Sites (Optional): Yes
Additional Area 3 Location: necklace lines
Detail Level: Simple
Consent: Written consent was obtained prior to the procedure. Risks, benefits, expectations and alternatives were discussed including, but not limited to, infection, bleeding, lid/brow ptosis, bruising, swelling, diplopia, temporary effects, incomplete chemical denervation and dissatisfaction with the cosmetic outcome. No guarantee or warranty was given or implied regarding longevity of results.
Map Statment: See attached map for complete details
Bill Summary Price Listed Below, Or Bill Total Of Units X Price Per Unit?: Bill #Units x Price Per Unit
Glabellar Complex Units: 10

## 2020-05-29 ENCOUNTER — APPOINTMENT (RX ONLY)
Dept: URBAN - METROPOLITAN AREA CLINIC 12 | Facility: CLINIC | Age: 45
Setting detail: DERMATOLOGY
End: 2020-05-29

## 2020-05-29 DIAGNOSIS — Z41.9 ENCOUNTER FOR PROCEDURE FOR PURPOSES OTHER THAN REMEDYING HEALTH STATE, UNSPECIFIED: ICD-10-CM

## 2020-05-29 PROCEDURE — ? FILLERS

## 2020-05-29 PROCEDURE — ? BOTOX

## 2020-05-29 NOTE — PROCEDURE: FILLERS
Jawline Filler Volume In Cc: 0
Additional Area 3 Location: smile line
Lot #: [id number]
Additional Area 2 Location: perioral lines
Topical Anesthetic Base: ointment
Map Statment: See attached map for complete details
Consent: Written consent obtained. Risks include but not limited to bruising, beading, irregular texture, ulceration, infection, allergic reaction, scar formation, incomplete augmentation, temporary nature, procedural pain.
Topical Anesthetic #2: tetracaine
Additional Area 4 Location: cheek depressions
Additional Area 3 Location: oral commissures
Administered By (Optional): Georgia Boucher RN
Filler: Belotero
Show Price In Note?: yes
Topical % #1: 23
Additional Area 5 Location: frontalis rhytids
Expiration Date (Month Year): 12/2020
Additional Area 4 Location: eyebrow lines
Additional Area 1 Location: glabellar or nasal root lines
Topical Anesthetic #1: lidocaine
Price $ (Numeric Only, No Text Please.): 90
Additional Area 5 Location: eyelid cheek margin
Additional Area 2 Location: glabellar line
Detail Level: Simple
Topical % #2: 7
Filler: Juvederm Ultra XC
Expiration Date (Month Year): 10/2020
Price $ (Numeric Only, No Text Please.): 395
Lot #: [id number]

## 2020-05-29 NOTE — PROCEDURE: BOTOX
Nasal Root Units: 0
Additional Area 2 Location: Chin
Periorbital Skin Units: 5
Map Statment: See attached map for complete details
Bill Summary Price Listed Below, Or Bill Total Of Units X Price Per Unit?: Bill #Units x Price Per Unit
Lot #: [id number]
Additional Area 1 Location: in the brow
Price Per Unit In $ (Use Numbers Only, No Text Please.): 15
Expiration Date (Month Year): 09/2022
Additional Area 3 Location: necklace lines
Glabellar Complex Units: 10
Consent: Written consent was obtained prior to the procedure. Risks, benefits, expectations and alternatives were discussed including, but not limited to, infection, bleeding, lid/brow ptosis, bruising, swelling, diplopia, temporary effects, incomplete chemical denervation and dissatisfaction with the cosmetic outcome. No guarantee or warranty was given or implied regarding longevity of results.
Postcare Instructions: Patient instructed to not lie down for 4 hours and limit physical activity for 24 hours. Patient instructed not to travel by airplane for 48 hours.
Administered By (Optional): Georgia Boucher
Detail Level: Simple
Show Price In Note?: yes

## 2020-09-08 ENCOUNTER — APPOINTMENT (RX ONLY)
Dept: URBAN - METROPOLITAN AREA CLINIC 12 | Facility: CLINIC | Age: 45
Setting detail: DERMATOLOGY
End: 2020-09-08

## 2020-09-08 DIAGNOSIS — Z41.9 ENCOUNTER FOR PROCEDURE FOR PURPOSES OTHER THAN REMEDYING HEALTH STATE, UNSPECIFIED: ICD-10-CM

## 2020-09-08 PROCEDURE — ? BOTOX

## 2020-09-08 NOTE — PROCEDURE: BOTOX
Bill Summary Price Listed Below, Or Bill Total Of Units X Price Per Unit?: Bill #Units x Price Per Unit
Nasal Root Units: 0
Additional Area 2 Location: Chin
Dilution (U/0.1 Cc): 5
Price Per Unit In $ (Use Numbers Only, No Text Please.): 15
Show Price In Note?: yes
Additional Area 1 Location: cheek attachment with striations
Map Statment: See attached map for complete details
Administered By (Optional): Georgia Boucher
Additional Area 3 Location: lateral platysma bands
Glabellar Complex Units: 10
Lot #: [id number]
Detail Level: Simple
Expiration Date (Month Year): 04/2023
Consent: Written consent was obtained prior to the procedure. Risks, benefits, expectations and alternatives were discussed including, but not limited to, infection, bleeding, lid/brow ptosis, bruising, swelling, diplopia, temporary effects, incomplete chemical denervation and dissatisfaction with the cosmetic outcome. No guarantee or warranty was given or implied regarding longevity of results.
Postcare Instructions: Patient instructed to not lie down for 4 hours and limit physical activity for 24 hours. Patient instructed not to travel by airplane for 48 hours.

## 2020-10-08 ENCOUNTER — OFFICE VISIT (OUTPATIENT)
Dept: URBAN - METROPOLITAN AREA SURGERY CENTER 16 | Facility: SURGERY CENTER | Age: 45
End: 2020-10-08
Payer: COMMERCIAL

## 2020-10-08 DIAGNOSIS — Z12.11 COLON CANCER SCREENING: ICD-10-CM

## 2020-10-08 PROCEDURE — 45378 DIAGNOSTIC COLONOSCOPY: CPT | Performed by: INTERNAL MEDICINE

## 2020-10-08 PROCEDURE — G9935 CANC NOT DETECTD DURING SRCN: HCPCS | Performed by: INTERNAL MEDICINE

## 2020-10-08 PROCEDURE — G8907 PT DOC NO EVENTS ON DISCHARG: HCPCS | Performed by: INTERNAL MEDICINE

## 2021-03-19 ENCOUNTER — RX ONLY (OUTPATIENT)
Age: 46
Setting detail: RX ONLY
End: 2021-03-19

## 2021-03-19 ENCOUNTER — APPOINTMENT (RX ONLY)
Dept: URBAN - METROPOLITAN AREA CLINIC 12 | Facility: CLINIC | Age: 46
Setting detail: DERMATOLOGY
End: 2021-03-19

## 2021-03-19 DIAGNOSIS — Z41.9 ENCOUNTER FOR PROCEDURE FOR PURPOSES OTHER THAN REMEDYING HEALTH STATE, UNSPECIFIED: ICD-10-CM

## 2021-03-19 PROCEDURE — ? BOTOX

## 2021-03-19 PROCEDURE — ? JUVEDERM ULTRA XC INJECTION

## 2021-03-19 RX ORDER — VALACYCLOVIR HYDROCHLORIDE 500 MG/1
TABLET, FILM COATED ORAL
Qty: 30 | Refills: 4 | Status: ERX

## 2021-03-19 NOTE — PROCEDURE: JUVEDERM ULTRA XC INJECTION
Vermilion Lips Filler Volume In Cc: 0
Topical Anesthesia?: 23% lidocaine, 7% tetracaine
Additional Area 1 Location: upper and lower lip
Number Of Syringes (Required For Inventory): 1
Post-Care Instructions: Patient instructed to apply ice to reduce swelling.
Procedural Text: The filler was administered to the treatment areas noted above.
Additional Area 3 Location: forehead wrinkles
Price (Use Numbers Only, No Special Characters Or $): 395
Additional Area 5 Location: underneath lower lip
Map Statment: See Attach Map for Complete Details
Expiration Date (Month Year): 01/2022
Additional Area 1 Volume In Cc: 0.5
Include Cannula Information In Note?: No
Filler: Juvederm Ultra XC
Additional Area 2 Location: ear lobes
Detail Level: Detailed
Additional Area 4 Location: chin
Lot #: [id number]
Consent: Written consent obtained. Risks include but not limited to bruising, beading, irregular texture, ulceration, infection, allergic reaction, scar formation, incomplete augmentation, temporary nature, procedural pain.

## 2021-07-09 ENCOUNTER — APPOINTMENT (RX ONLY)
Dept: URBAN - METROPOLITAN AREA CLINIC 12 | Facility: CLINIC | Age: 46
Setting detail: DERMATOLOGY
End: 2021-07-09

## 2021-07-09 DIAGNOSIS — Z41.9 ENCOUNTER FOR PROCEDURE FOR PURPOSES OTHER THAN REMEDYING HEALTH STATE, UNSPECIFIED: ICD-10-CM

## 2021-07-09 PROCEDURE — ? BOTOX

## 2021-07-09 NOTE — PROCEDURE: BOTOX
Kirk Units: 0
Dilution (U/0.1 Cc): 5
Additional Area 1 Location: tail end of brows
Consent: Written consent was obtained prior to the procedure. Risks, benefits, expectations and alternatives were discussed including, but not limited to, infection, bleeding, lid/brow ptosis, bruising, swelling, diplopia, temporary effects, incomplete chemical denervation and dissatisfaction with the cosmetic outcome. No guarantee or warranty was given or implied regarding longevity of results.
Additional Area 2 Location: Chin
Glabellar Complex Units: 10
Administered By (Optional): Georgia Boucher
Show Price In Note?: yes
Bill Summary Price Listed Below, Or Bill Total Of Units X Price Per Unit?: Bill #Units x Price Per Unit
Price Per Unit In $ (Use Numbers Only, No Text Please.): 15
Detail Level: Simple
Map Statment: See attached map for complete details
Periorbital Skin Units: 17.5
Postcare Instructions: Patient instructed to not lie down for 4 hours and limit physical activity for 24 hours. Patient instructed not to travel by airplane for 48 hours.
Forehead Units: 7.5
Expiration Date (Month Year): 10/2023
Additional Area 3 Location: masseter muscles
Lot #: [id number]

## 2021-11-07 NOTE — PROCEDURE: FILLERS
Lower Lips Filler Volume In Cc: 0.1
Additional Area 4 Location: glabellar lines
Dorsal Hands Filler Volume In Cc: 0
Additional Area 2 Location: lips and smile line
Topical Anesthesia?: yes
Map Statment: See attached map for complete details
Yes
Expiration Date (Month Year): 09/2018
Filler: Juvederm Ultra Plus XC
Upper Lip Filler Volume In Cc: 0.2
Use Map Statement For Sites (Optional): No
Detail Level: Simple
Consent: Written consent obtained. Risks include but not limited to bruising, beading, irregular texture, ulceration, infection, allergic reaction, scar formation, incomplete augmentation, temporary nature, procedural pain.
Additional Area 1 Location: Perioral
Additional Area 1 Location: Oral commissures
Lot #: [id number]
Additional Area 3 Location: Ear lobe
Administered By (Optional): Georgia Boucher
Additional Area 4 Location: chin
Topical Anesthetic Base: ointment
Additional Area 2 Location: forehead depressions

## 2024-03-18 ENCOUNTER — OV NP (OUTPATIENT)
Dept: URBAN - METROPOLITAN AREA CLINIC 98 | Facility: CLINIC | Age: 49
End: 2024-03-18
Payer: COMMERCIAL

## 2024-03-18 VITALS
WEIGHT: 172.6 LBS | DIASTOLIC BLOOD PRESSURE: 63 MMHG | HEIGHT: 67 IN | HEART RATE: 75 BPM | TEMPERATURE: 97.2 F | SYSTOLIC BLOOD PRESSURE: 103 MMHG | BODY MASS INDEX: 27.09 KG/M2

## 2024-03-18 DIAGNOSIS — K76.0 FATTY LIVER: ICD-10-CM

## 2024-03-18 DIAGNOSIS — R79.89 ELEVATED LIVER FUNCTION TESTS: ICD-10-CM

## 2024-03-18 PROBLEM — 197321007: Status: ACTIVE | Noted: 2024-03-18

## 2024-03-18 PROCEDURE — 99204 OFFICE O/P NEW MOD 45 MIN: CPT

## 2024-03-18 NOTE — HPI-TODAY'S VISIT:
Ms. Hart is a 48 year old female new patient, referred to Dr. Ibarra PCP Dr. Eleuterio Kramer; progress note will be sent following visit  VIsit 3/18/24- Екатерина Navarro NP - Here to follow-up after elevated LFT and fatty liver - Abdominal ultrasound 3/5 > signs of fatty liver- report not available [facility] - Labs 2/24/24 > T. marina 0.55, albumin 4.2, alp 71, alt 57, Cr 0.57, ast 35, LINDSAY elevated - Referred to Dr. Ibarra for hepatology f/u - Last year lost 28 pounds with diet/exercise - Eating a low carb diet - Drinks minimal ETOH- drinks about 1-2 per month - Supplements: multi-vitamin, vitamin D,  - Over all feels great.

## 2024-03-25 ENCOUNTER — OV EP (OUTPATIENT)
Dept: URBAN - METROPOLITAN AREA CLINIC 98 | Facility: CLINIC | Age: 49
End: 2024-03-25
Payer: COMMERCIAL

## 2024-03-25 VITALS
BODY MASS INDEX: 26.68 KG/M2 | RESPIRATION RATE: 18 BRPM | TEMPERATURE: 97.1 F | WEIGHT: 170 LBS | HEART RATE: 68 BPM | HEIGHT: 67 IN | SYSTOLIC BLOOD PRESSURE: 105 MMHG | DIASTOLIC BLOOD PRESSURE: 65 MMHG

## 2024-03-25 DIAGNOSIS — R79.89 ELEVATED LIVER FUNCTION TESTS: ICD-10-CM

## 2024-03-25 DIAGNOSIS — K76.0 FATTY LIVER: ICD-10-CM

## 2024-03-25 LAB
A/G RATIO: 1.8
ACTIN (SMOOTH MUSCLE) ANTIBODY: 52
AFP, SERUM, TUMOR MARKER: 1.8
ALBUMIN: 4.5
ALKALINE PHOSPHATASE: 68
ALT (SGPT): 28
ANTI-CENTROMERE B ANTIBODIES: <0.2
ANTI-DNA (DS) AB QN: 1
ANTI-JO-1: <0.2
ANTICHROMATIN ANTIBODIES: 0.2
ANTISCLERODERMA-70 ANTIBODIES: <0.2
AST (SGOT): 21
BASO (ABSOLUTE): 0
BASOS: 1
BILIRUBIN, TOTAL: 0.6
BUN/CREATININE RATIO: 13
BUN: 12
CALCIUM: 9.5
CARBON DIOXIDE, TOTAL: 25
CHLORIDE: 101
CREATININE: 0.96
EGFR: 73
EOS (ABSOLUTE): 0
EOS: 1
FERRITIN, SERUM: 71
GGT: 41
GLOBULIN, TOTAL: 2.5
GLUCOSE: 85
HEMATOCRIT: 41
HEMATOLOGY COMMENTS:: (no result)
HEMOGLOBIN: 13.2
HEP A AB, TOTAL: NEGATIVE
IMMATURE CELLS: (no result)
IMMATURE GRANS (ABS): 0
IMMATURE GRANULOCYTES: 0
IMMUNOGLOBULIN A, QN, SERUM: 133
IMMUNOGLOBULIN E, TOTAL: 3
IMMUNOGLOBULIN G, QN, SERUM: 1237
IMMUNOGLOBULIN M, QN, SERUM: 70
INR: 1.1
IRON BIND.CAP.(TIBC): 270
IRON SATURATION: 30
IRON: 80
LYMPHS (ABSOLUTE): 1.8
LYMPHS: 49
Lab: (no result)
MCH: 28.8
MCHC: 32.2
MCV: 89
MITOCHONDRIAL (M2) ANTIBODY: <20
MONOCYTES(ABSOLUTE): 0.2
MONOCYTES: 6
NEUTROPHILS (ABSOLUTE): 1.5
NEUTROPHILS: 43
NRBC: (no result)
PLATELETS: 188
POTASSIUM: 4.3
PROTEIN, TOTAL: 7
PROTHROMBIN TIME: 11.2
RBC: 4.59
RDW: 12.7
RNP ANTIBODIES: <0.2
SJOGREN'S ANTI-SS-A: <0.2
SJOGREN'S ANTI-SS-B: <0.2
SMITH ANTIBODIES: <0.2
SODIUM: 140
UIBC: 190
WBC: 3.6

## 2024-03-25 PROCEDURE — 997 AG2 (NON BILLABLE)

## 2024-03-25 NOTE — HPI-TODAY'S VISIT:
Patient arrived for visit. Labs not availble to review with patient Office visit not completed and rescheduled for a different day

## 2024-03-29 ENCOUNTER — TELPHEP (OUTPATIENT)
Dept: URBAN - METROPOLITAN AREA TELEHEALTH 2 | Facility: TELEHEALTH | Age: 49
End: 2024-03-29
Payer: COMMERCIAL

## 2024-03-29 VITALS — BODY MASS INDEX: 26.37 KG/M2 | WEIGHT: 168 LBS | HEIGHT: 67 IN

## 2024-03-29 DIAGNOSIS — R79.89 ELEVATED LIVER FUNCTION TESTS: ICD-10-CM

## 2024-03-29 DIAGNOSIS — K76.0 FATTY LIVER: ICD-10-CM

## 2024-03-29 PROCEDURE — 99442 PHONE E/M BY PHYS 11-20 MIN: CPT

## 2024-03-29 NOTE — HPI-TODAY'S VISIT:
Ms. Hart is a 48 year old female new patient, referred to Dr. Ibarra PCP Dr. Eleuterio Kramer; progress note will be sent to office Visit 3/18/2024- Екатерина Navarro NP - Here to follow-up after elevated LFT and fatty liver - Abdominal ultrasound 3/5 > signs of fatty liver- report not available [facility] - Labs 2/24/24 > T. marina 0.55, albumin 4.2, alp 71, alt 57, Cr 0.57, ast 35, LINDSAY elevated - Referred to Dr. Ibarra for hepatology f/u - Last year lost 28 pounds with diet/exercise - Eating a low carb diet - Drinks minimal ETOH- drinks about 1-2 per month - Supplements: multi-vitamin, vitamin D,  - Over all feels great. Visit 3/29/24- Екатерина Navarro NP - Present for phone call follow-up to discuss elevated LFT - Labs drawn> hepatitis panel negative; LFT returned to normal; LINDSAY and ASMA > present - Doing great with diet and exercise; has lost 30 pounds - Drinking minimal ETOH - Over all doing well

## 2024-09-04 ENCOUNTER — TELEPHONE ENCOUNTER (OUTPATIENT)
Dept: URBAN - METROPOLITAN AREA CLINIC 3 | Facility: CLINIC | Age: 49
End: 2024-09-04

## 2024-09-04 ENCOUNTER — OFFICE VISIT (OUTPATIENT)
Dept: URBAN - METROPOLITAN AREA CLINIC 98 | Facility: CLINIC | Age: 49
End: 2024-09-04
Payer: COMMERCIAL

## 2024-09-04 ENCOUNTER — DASHBOARD ENCOUNTERS (OUTPATIENT)
Age: 49
End: 2024-09-04

## 2024-09-04 VITALS — BODY MASS INDEX: 26.21 KG/M2 | HEIGHT: 67 IN | WEIGHT: 167 LBS

## 2024-09-04 DIAGNOSIS — R79.89 ELEVATED LIVER FUNCTION TESTS: ICD-10-CM

## 2024-09-04 DIAGNOSIS — K76.0 FATTY LIVER: ICD-10-CM

## 2024-09-04 PROCEDURE — 99213 OFFICE O/P EST LOW 20 MIN: CPT

## 2024-09-04 NOTE — HPI-TODAY'S VISIT:
Ms. Hart is a 48 year old female new patient, referred to Dr. Ibarra PCP Dr. Eleuterio Kramer; progress note will be sent to office Visit 3/18/2024- Екатерина Navarro NP - Here to follow-up after elevated LFT and fatty liver - Abdominal ultrasound 3/5 > signs of fatty liver- report not available [facility] - Labs 2/24/24 > T. marina 0.55, albumin 4.2, alp 71, alt 57, Cr 0.57, ast 35, LINDSAY elevated - Referred to Dr. Ibarra for hepatology f/u - Last year lost 28 pounds with diet/exercise - Eating a low carb diet - Drinks minimal ETOH- drinks about 1-2 per month - Supplements: multi-vitamin, vitamin D,  - Over all feels great. Visit 3/29/24- Екатерина Navarro NP - Present for phone call follow-up to discuss elevated LFT - Labs drawn> hepatitis panel negative; LFT returned to normal; LINDSAY and ASMA > present - Doing great with diet and exercise; has lost 30 pounds - Drinking minimal ETOH - Over all doing well  9/4/24- Екатерина Navarro NP - 50 yo present for Telehealth to f/u elevated LFT and fatty liver - Still on health journey - trying to lose weight - Difficult time exercising in the heat - otherwise doing well

## 2024-09-09 ENCOUNTER — LAB OUTSIDE AN ENCOUNTER (OUTPATIENT)
Dept: URBAN - METROPOLITAN AREA TELEHEALTH 2 | Facility: TELEHEALTH | Age: 49
End: 2024-09-09

## 2024-10-31 NOTE — PROCEDURE: BOTOX
Administered By (Optional): Georgia Boucher
[facility]    
Topical Anesthesia?: 23% lidocaine, 7% tetracaine
Dilution (U/0.1 Cc): 5
Price Per Unit In $ (Use Numbers Only, No Text Please.): 15
Perioral Units: 0
Use Map Statement For Sites (Optional): Yes
Additional Area 3 Location: masseter muscles
Detail Level: Simple
Expiration Date (Month Year): 12/2023
Postcare Instructions: Patient instructed to not lie down for 4 hours and limit physical activity for 24 hours. Patient instructed not to travel by airplane for 48 hours.
Periorbital Skin Units: 10
Additional Area 1 Location: tail end of brow
Lot #: [id number]
Consent: Written consent was obtained prior to the procedure. Risks, benefits, expectations and alternatives were discussed including, but not limited to, infection, bleeding, lid/brow ptosis, bruising, swelling, diplopia, temporary effects, incomplete chemical denervation and dissatisfaction with the cosmetic outcome. No guarantee or warranty was given or implied regarding longevity of results.
Map Statment: See attached map for complete details
Additional Area 2 Location: Chin
Bill Summary Price Listed Below, Or Bill Total Of Units X Price Per Unit?: Bill #Units x Price Per Unit